# Patient Record
Sex: FEMALE | Race: WHITE | URBAN - METROPOLITAN AREA
[De-identification: names, ages, dates, MRNs, and addresses within clinical notes are randomized per-mention and may not be internally consistent; named-entity substitution may affect disease eponyms.]

---

## 2023-02-09 ENCOUNTER — EMERGENCY (EMERGENCY)
Facility: HOSPITAL | Age: 78
LOS: 0 days | Discharge: HOME | End: 2023-02-10
Attending: STUDENT IN AN ORGANIZED HEALTH CARE EDUCATION/TRAINING PROGRAM
Payer: MEDICARE

## 2023-02-09 VITALS
SYSTOLIC BLOOD PRESSURE: 146 MMHG | OXYGEN SATURATION: 96 % | HEART RATE: 68 BPM | RESPIRATION RATE: 18 BRPM | DIASTOLIC BLOOD PRESSURE: 68 MMHG

## 2023-02-09 VITALS — TEMPERATURE: 98 F

## 2023-02-09 DIAGNOSIS — Z20.822 CONTACT WITH AND (SUSPECTED) EXPOSURE TO COVID-19: ICD-10-CM

## 2023-02-09 DIAGNOSIS — Z98.890 OTHER SPECIFIED POSTPROCEDURAL STATES: ICD-10-CM

## 2023-02-09 DIAGNOSIS — Z98.1 ARTHRODESIS STATUS: ICD-10-CM

## 2023-02-09 DIAGNOSIS — R51.9 HEADACHE, UNSPECIFIED: ICD-10-CM

## 2023-02-09 DIAGNOSIS — G51.0 BELL'S PALSY: ICD-10-CM

## 2023-02-09 DIAGNOSIS — R42 DIZZINESS AND GIDDINESS: ICD-10-CM

## 2023-02-09 DIAGNOSIS — R29.810 FACIAL WEAKNESS: ICD-10-CM

## 2023-02-09 DIAGNOSIS — Z85.3 PERSONAL HISTORY OF MALIGNANT NEOPLASM OF BREAST: ICD-10-CM

## 2023-02-09 LAB
ALBUMIN SERPL ELPH-MCNC: 4.3 G/DL — SIGNIFICANT CHANGE UP (ref 3.5–5.2)
ALP SERPL-CCNC: 93 U/L — SIGNIFICANT CHANGE UP (ref 30–115)
ALT FLD-CCNC: 14 U/L — SIGNIFICANT CHANGE UP (ref 0–41)
ANION GAP SERPL CALC-SCNC: 11 MMOL/L — SIGNIFICANT CHANGE UP (ref 7–14)
APTT BLD: 29.8 SEC — SIGNIFICANT CHANGE UP (ref 27–39.2)
AST SERPL-CCNC: 34 U/L — SIGNIFICANT CHANGE UP (ref 0–41)
BASE EXCESS BLDV CALC-SCNC: 2.5 MMOL/L — SIGNIFICANT CHANGE UP (ref -2–3)
BASE EXCESS BLDV CALC-SCNC: 4 MMOL/L — HIGH (ref -2–3)
BASOPHILS # BLD AUTO: 0.04 K/UL — SIGNIFICANT CHANGE UP (ref 0–0.2)
BASOPHILS NFR BLD AUTO: 0.5 % — SIGNIFICANT CHANGE UP (ref 0–1)
BILIRUB SERPL-MCNC: 0.4 MG/DL — SIGNIFICANT CHANGE UP (ref 0.2–1.2)
BUN SERPL-MCNC: 20 MG/DL — SIGNIFICANT CHANGE UP (ref 10–20)
CA-I SERPL-SCNC: 1.19 MMOL/L — SIGNIFICANT CHANGE UP (ref 1.15–1.33)
CA-I SERPL-SCNC: 1.24 MMOL/L — SIGNIFICANT CHANGE UP (ref 1.15–1.33)
CALCIUM SERPL-MCNC: 9.7 MG/DL — SIGNIFICANT CHANGE UP (ref 8.4–10.5)
CHLORIDE SERPL-SCNC: 100 MMOL/L — SIGNIFICANT CHANGE UP (ref 98–110)
CO2 SERPL-SCNC: 26 MMOL/L — SIGNIFICANT CHANGE UP (ref 17–32)
CREAT SERPL-MCNC: 0.8 MG/DL — SIGNIFICANT CHANGE UP (ref 0.7–1.5)
EGFR: 75 ML/MIN/1.73M2 — SIGNIFICANT CHANGE UP
EOSINOPHIL # BLD AUTO: 0.07 K/UL — SIGNIFICANT CHANGE UP (ref 0–0.7)
EOSINOPHIL NFR BLD AUTO: 0.9 % — SIGNIFICANT CHANGE UP (ref 0–8)
GAS PNL BLDV: 134 MMOL/L — LOW (ref 136–145)
GAS PNL BLDV: 136 MMOL/L — SIGNIFICANT CHANGE UP (ref 136–145)
GAS PNL BLDV: SIGNIFICANT CHANGE UP
GAS PNL BLDV: SIGNIFICANT CHANGE UP
GLUCOSE SERPL-MCNC: 105 MG/DL — HIGH (ref 70–99)
HCO3 BLDV-SCNC: 28 MMOL/L — SIGNIFICANT CHANGE UP (ref 22–29)
HCO3 BLDV-SCNC: 28 MMOL/L — SIGNIFICANT CHANGE UP (ref 22–29)
HCT VFR BLD CALC: 40.2 % — SIGNIFICANT CHANGE UP (ref 37–47)
HCT VFR BLDA CALC: 41 % — SIGNIFICANT CHANGE UP (ref 39–51)
HCT VFR BLDA CALC: 41 % — SIGNIFICANT CHANGE UP (ref 39–51)
HGB BLD CALC-MCNC: 13.5 G/DL — SIGNIFICANT CHANGE UP (ref 12.6–17.4)
HGB BLD CALC-MCNC: 13.6 G/DL — SIGNIFICANT CHANGE UP (ref 12.6–17.4)
HGB BLD-MCNC: 13.6 G/DL — SIGNIFICANT CHANGE UP (ref 12–16)
IMM GRANULOCYTES NFR BLD AUTO: 0.1 % — SIGNIFICANT CHANGE UP (ref 0.1–0.3)
INR BLD: 1.03 RATIO — SIGNIFICANT CHANGE UP (ref 0.65–1.3)
LACTATE BLDV-MCNC: 1.3 MMOL/L — SIGNIFICANT CHANGE UP (ref 0.5–2)
LACTATE BLDV-MCNC: 1.9 MMOL/L — SIGNIFICANT CHANGE UP (ref 0.5–2)
LYMPHOCYTES # BLD AUTO: 3.11 K/UL — SIGNIFICANT CHANGE UP (ref 1.2–3.4)
LYMPHOCYTES # BLD AUTO: 42 % — SIGNIFICANT CHANGE UP (ref 20.5–51.1)
MCHC RBC-ENTMCNC: 31.2 PG — HIGH (ref 27–31)
MCHC RBC-ENTMCNC: 33.8 G/DL — SIGNIFICANT CHANGE UP (ref 32–37)
MCV RBC AUTO: 92.2 FL — SIGNIFICANT CHANGE UP (ref 81–99)
MONOCYTES # BLD AUTO: 1.11 K/UL — HIGH (ref 0.1–0.6)
MONOCYTES NFR BLD AUTO: 15 % — HIGH (ref 1.7–9.3)
NEUTROPHILS # BLD AUTO: 3.06 K/UL — SIGNIFICANT CHANGE UP (ref 1.4–6.5)
NEUTROPHILS NFR BLD AUTO: 41.5 % — LOW (ref 42.2–75.2)
NRBC # BLD: 0 /100 WBCS — SIGNIFICANT CHANGE UP (ref 0–0)
PCO2 BLDV: 37 MMHG — LOW (ref 39–42)
PCO2 BLDV: 45 MMHG — HIGH (ref 39–42)
PH BLDV: 7.4 — SIGNIFICANT CHANGE UP (ref 7.32–7.43)
PH BLDV: 7.48 — HIGH (ref 7.32–7.43)
PLATELET # BLD AUTO: 184 K/UL — SIGNIFICANT CHANGE UP (ref 130–400)
PO2 BLDV: 27 MMHG — SIGNIFICANT CHANGE UP
PO2 BLDV: 41 MMHG — SIGNIFICANT CHANGE UP
POTASSIUM BLDV-SCNC: 3.8 MMOL/L — SIGNIFICANT CHANGE UP (ref 3.5–5.1)
POTASSIUM BLDV-SCNC: 5.4 MMOL/L — HIGH (ref 3.5–5.1)
POTASSIUM SERPL-MCNC: 5.9 MMOL/L — HIGH (ref 3.5–5)
POTASSIUM SERPL-SCNC: 5.9 MMOL/L — HIGH (ref 3.5–5)
PROT SERPL-MCNC: 7 G/DL — SIGNIFICANT CHANGE UP (ref 6–8)
PROTHROM AB SERPL-ACNC: 11.8 SEC — SIGNIFICANT CHANGE UP (ref 9.95–12.87)
RBC # BLD: 4.36 M/UL — SIGNIFICANT CHANGE UP (ref 4.2–5.4)
RBC # FLD: 13.2 % — SIGNIFICANT CHANGE UP (ref 11.5–14.5)
SAO2 % BLDV: 34.3 % — SIGNIFICANT CHANGE UP
SAO2 % BLDV: 71.2 % — SIGNIFICANT CHANGE UP
SARS-COV-2 RNA SPEC QL NAA+PROBE: SIGNIFICANT CHANGE UP
SODIUM SERPL-SCNC: 137 MMOL/L — SIGNIFICANT CHANGE UP (ref 135–146)
TROPONIN T SERPL-MCNC: <0.01 NG/ML — SIGNIFICANT CHANGE UP
WBC # BLD: 7.4 K/UL — SIGNIFICANT CHANGE UP (ref 4.8–10.8)
WBC # FLD AUTO: 7.4 K/UL — SIGNIFICANT CHANGE UP (ref 4.8–10.8)

## 2023-02-09 PROCEDURE — 84132 ASSAY OF SERUM POTASSIUM: CPT

## 2023-02-09 PROCEDURE — 70498 CT ANGIOGRAPHY NECK: CPT | Mod: 26,MA

## 2023-02-09 PROCEDURE — 85610 PROTHROMBIN TIME: CPT

## 2023-02-09 PROCEDURE — 85730 THROMBOPLASTIN TIME PARTIAL: CPT

## 2023-02-09 PROCEDURE — 80053 COMPREHEN METABOLIC PANEL: CPT

## 2023-02-09 PROCEDURE — 70498 CT ANGIOGRAPHY NECK: CPT | Mod: MA

## 2023-02-09 PROCEDURE — 87635 SARS-COV-2 COVID-19 AMP PRB: CPT

## 2023-02-09 PROCEDURE — 70450 CT HEAD/BRAIN W/O DYE: CPT | Mod: 26,MA,59

## 2023-02-09 PROCEDURE — 93010 ELECTROCARDIOGRAM REPORT: CPT

## 2023-02-09 PROCEDURE — 85018 HEMOGLOBIN: CPT

## 2023-02-09 PROCEDURE — 84295 ASSAY OF SERUM SODIUM: CPT

## 2023-02-09 PROCEDURE — 70450 CT HEAD/BRAIN W/O DYE: CPT | Mod: MA

## 2023-02-09 PROCEDURE — 82803 BLOOD GASES ANY COMBINATION: CPT

## 2023-02-09 PROCEDURE — 70551 MRI BRAIN STEM W/O DYE: CPT | Mod: 26,MA

## 2023-02-09 PROCEDURE — 84484 ASSAY OF TROPONIN QUANT: CPT

## 2023-02-09 PROCEDURE — 70496 CT ANGIOGRAPHY HEAD: CPT | Mod: MA,XU

## 2023-02-09 PROCEDURE — 93010 ELECTROCARDIOGRAM REPORT: CPT | Mod: 77

## 2023-02-09 PROCEDURE — 70496 CT ANGIOGRAPHY HEAD: CPT | Mod: 26,MA

## 2023-02-09 PROCEDURE — 0042T: CPT | Mod: MA

## 2023-02-09 PROCEDURE — 82330 ASSAY OF CALCIUM: CPT

## 2023-02-09 PROCEDURE — 36415 COLL VENOUS BLD VENIPUNCTURE: CPT

## 2023-02-09 PROCEDURE — 93005 ELECTROCARDIOGRAM TRACING: CPT

## 2023-02-09 PROCEDURE — 70551 MRI BRAIN STEM W/O DYE: CPT | Mod: MA

## 2023-02-09 PROCEDURE — 99285 EMERGENCY DEPT VISIT HI MDM: CPT | Mod: 25

## 2023-02-09 PROCEDURE — 85014 HEMATOCRIT: CPT

## 2023-02-09 PROCEDURE — 99285 EMERGENCY DEPT VISIT HI MDM: CPT

## 2023-02-09 PROCEDURE — 85025 COMPLETE CBC W/AUTO DIFF WBC: CPT

## 2023-02-09 PROCEDURE — 83605 ASSAY OF LACTIC ACID: CPT

## 2023-02-09 RX ORDER — VALACYCLOVIR 500 MG/1
1 TABLET, FILM COATED ORAL
Qty: 21 | Refills: 0
Start: 2023-02-09 | End: 2023-02-15

## 2023-02-09 RX ORDER — VALACYCLOVIR 500 MG/1
1000 TABLET, FILM COATED ORAL ONCE
Refills: 0 | Status: COMPLETED | OUTPATIENT
Start: 2023-02-09 | End: 2023-02-09

## 2023-02-09 RX ADMIN — VALACYCLOVIR 1000 MILLIGRAM(S): 500 TABLET, FILM COATED ORAL at 23:00

## 2023-02-09 RX ADMIN — Medication 60 MILLIGRAM(S): at 22:31

## 2023-02-09 NOTE — ED PROVIDER NOTE - NSFOLLOWUPINSTRUCTIONS_ED_ALL_ED_FT
[place]'s Palsy    Bell's palsy is a condition in which the muscles on one side of the face become paralyzed. This often causes one side of the face to droop. It is a common condition and most people recover completely. Causes include viral infections but most of the time the reason remains unknown. Signs and symptoms include not being able to raise your eyebrow, not being able to close your eye, drooping of the eyelid and corner of the mouth, sensitivity to loud noises, dryness of the eye, change in taste, and not being able to close your mouth/drooling. Take medicines only as directed by your health care provider. If your eye is affected use moisturizing eye drops to prevent drying of your eye and tape your eyelid shut at night as directed by your health care provider.    SEEK IMMEDIATE MEDICAL CARE IF YOU HAVE THE FOLLOWING SYMPTOMS: weakness or numbness in another part of your body, difficulty swallowing, fever, or neck pain.      Eye care to prevent corneal injury — All patients with Bell's palsy should be assessed for the completeness of eyelid closure. For those with incomplete closure (ie, sclera remains visible when the patient is asked to close the eye), meticulous eye care is required to prevent corneal injury [2]. In addition to physical exposure due to eyelid weakness, the cornea is at increased risk for dryness and abrasion due to reduced tear production from the lacrimal gland, which is also innervated by the facial nerve.    Eye care includes the following:    ?Waking hours – Patients should use artificial tear drops (liquid or gel) four times daily and up to hourly if needed. Gel formulations may cause transient blurry vision when applied. Artificial tears are available over the counter. Formulations with preservatives are safe for use four times a day, but preservative-free formulations are probably safer for more frequent application. (See "Dry eye disease", section on 'Artificial tears'.)    Protective glasses or goggles can be worn to physically protect the eye from external trauma. Some patients may prefer to tape the eyelid closed during the day to prevent exposure, using the same procedure as described for sleep. Patches are generally not recommended because there is a tendency for the eye to open under the patch and expose the cornea.    Patients with refractory eye dryness and exposure symptoms should be referred to ophthalmology for discussion of additional options such as a scleral lens, adhesive moisture chambers, temporary-suture tarsorrhaphy, and gold or platinum eyelid weighting

## 2023-02-09 NOTE — ED PROVIDER NOTE - OBJECTIVE STATEMENT
Patient is a 77 year old female with a PMH of breast cancer and lumbar fusion 25 years ago presenting for a stroke code. Per patient, she had a sudden onset of left facial weakness this morning, associated with vertigo and general weakness as well as difficulty walking (last well known: last night around 2200 hours). Patient endorses she had a dental procedure yesterday but otherwise denies any recent trauma, headaches, environmental exposures, fevers, chest pain, nausea, vomiting, dizziness, confusion, or additional complaints.

## 2023-02-09 NOTE — ED PROVIDER NOTE - ATTENDING CONTRIBUTION TO CARE
77 yr old f w/ a pmh significant for breast cancer who presents as a stroke notification. As per pt, at ~ 5:30 pm, while shopping she developed a headache, pts  then noticed a L sided facial drop. Pt denies any other medical complaints.     VITAL SIGNS: I have reviewed nursing notes and confirm.  CONSTITUTIONAL: non-toxic, well appearing  SKIN: no rash, no petechiae.  EYES: EOMI, pink conjunctiva, anicteric  ENT: tongue midline, no exudates, MMM  NECK: Supple; no meningismus, no JVD  CARD: RRR, no murmurs, equal radial pulses bilaterally 2+  RESP: CTAB, no respiratory distress  ABD: Soft, non-tender, non-distended, no peritoneal signs, no HSM, no CVA tenderness  EXT: Normal ROM x4. No edema. No calves tenderness  NEURO: Alert, oriented x3.  L sided facial droop    77 yr old f that presents as a stroke notification. neuro at bedside. tele stroke aware. labs, ekg, imaging, reassess. dispo pending.

## 2023-02-09 NOTE — ED ADULT TRIAGE NOTE - SOURCE OF INFORMATION
Patient/EMS pt received semi denny in bed in [facility], agreeable to OT evaluation, son present, +IV lock, left seated in b/s chair in NAD, RN aware

## 2023-02-09 NOTE — CONSULT NOTE ADULT - SUBJECTIVE AND OBJECTIVE BOX
Neurology Consult    Patient is a 77y old  Female who presents with a chief complaint of left facial weakness    HPI:  77 yr old right handed woman with a PMH of breast cancer and lumbar fusion 25 years ago. Presents with left facial asymmetry noticed by her family. Patient with several teeth extracted one day prior to presentation. Patient with GI illness within the last week, left ear discomfort within the last three days. Patient last asymptomatic the previous evening at 2200. Woke up 1000 on DOP with room spinning dizziness and generalized weakness as well as gait ataxia. At 5:30 pm, while shopping she developed a left sided dull headache,  then noticed a L sided facial drop. Stroke code en route. Patient initially described LUE numbness which was transient.         PAST MEDICAL & SURGICAL HISTORY:      FAMILY HISTORY:      Social History: (-) x 3    Allergies    No Known Allergies    Intolerances        MEDICATIONS  (STANDING):    MEDICATIONS  (PRN):      Vital Signs Last 24 Hrs  T(C): --  T(F): --  HR: 68 (09 Feb 2023 20:42) (68 - 68)  BP: 146/68 (09 Feb 2023 20:42) (146/68 - 146/68)  BP(mean): --  RR: 18 (09 Feb 2023 20:42) (18 - 18)  SpO2: 96% (09 Feb 2023 20:42) (96% - 96%)    Parameters below as of 09 Feb 2023 20:42  Patient On (Oxygen Delivery Method): room air        Examination:  Cognitive/Language:  The patient is oriented to person, place, time and date.  Recent and remote memory intact.  Fund of knowledge is intact and normal.  Language with normal repetition, comprehension and naming.  Subtly dysarthric.    Eyes: intact VA, VFF.  EOMI w/o nystagmus, skew or reported double vision.  PERRL.  No ptosis/weakness of eyelid closure.    Face:  asynchronous blinking of left eye, left eyelid weakness, inability to raise left eyebrow(left forehead flattening), asymmetric smile.   Formal Muscle Strength Testing: (MRC grade R/L) 5/5 UE; 5/5 LE.  Sensory examination:   Intact to light touch in all extremities however left facial numbness present.  Cerebellum:   FTN/HKS intact with normal BROOKE in all limbs.  No dysmetria or dysdiadokinesia.  Gait narrow based and normal on ambulation.       NIHSS 4    Labs:   CBC Full  -  ( 09 Feb 2023 19:18 )  WBC Count : 7.40 K/uL  RBC Count : 4.36 M/uL  Hemoglobin : 13.6 g/dL  Hematocrit : 40.2 %  Platelet Count - Automated : 184 K/uL  Mean Cell Volume : 92.2 fL  Mean Cell Hemoglobin : 31.2 pg  Mean Cell Hemoglobin Concentration : 33.8 g/dL  Auto Neutrophil # : 3.06 K/uL  Auto Lymphocyte # : 3.11 K/uL  Auto Monocyte # : 1.11 K/uL  Auto Eosinophil # : 0.07 K/uL  Auto Basophil # : 0.04 K/uL  Auto Neutrophil % : 41.5 %  Auto Lymphocyte % : 42.0 %  Auto Monocyte % : 15.0 %  Auto Eosinophil % : 0.9 %  Auto Basophil % : 0.5 %    02-09    137  |  100  |  20  ----------------------------<  105<H>  5.9<H>   |  26  |  0.8    Ca    9.7      09 Feb 2023 19:18    TPro  7.0  /  Alb  4.3  /  TBili  0.4  /  DBili  x   /  AST  34  /  ALT  14  /  AlkPhos  93  02-09    LIVER FUNCTIONS - ( 09 Feb 2023 19:18 )  Alb: 4.3 g/dL / Pro: 7.0 g/dL / ALK PHOS: 93 U/L / ALT: 14 U/L / AST: 34 U/L / GGT: x           PT/INR - ( 09 Feb 2023 19:18 )   PT: 11.80 sec;   INR: 1.03 ratio         PTT - ( 09 Feb 2023 19:18 )  PTT:29.8 sec        Neuroimaging:  NCHCT:   < from: CT Brain Stroke Protocol (02.09.23 @ 19:13) >  IMPRESSION:    No acute intracranial pathology.    < end of copied text >  < from: CT Brain Perfusion Maps Stroke (02.09.23 @ 19:31) >    IMPRESSION:    Negative CTA of the head and neck    No evidence of major vascular stenosis, occlusion, or aneurysm.    No perfusion abnormalities. No areas of core infarct or ischemic penumbra.    < end of copied text >    02-09-23 @ 20:44

## 2023-02-09 NOTE — ED PROVIDER NOTE - CLINICAL SUMMARY MEDICAL DECISION MAKING FREE TEXT BOX
77 yr old f that presents with L sided facial drooping. Pt called as a stroke code. Labs and EKG were ordered and reviewed.  Imaging was ordered and reviewed by me.  Appropriate medications for patient's presenting complaints were ordered and effects were reassessed.  Patient's records (prior hospital, ED visit, and/or nursing home notes if available) were reviewed.  Additional history was obtained from EMS, family, and/or PCP (where available).  Escalation to admission/observation was considered.  However patient feels much better and is comfortable with discharge.  Appropriate follow-up was arranged. MRI negative. Pt evaluated and cleared by neurology. concern for bells palsy. will dc pt with antivirals and steroids.     I have discussed the discharge plan with the patient. The patient agrees with the plan, as discussed.  The patient understands Emergency Department diagnosis is a preliminary diagnosis often based on limited information and that the patient must adhere to the follow-up plan as discussed.  The patient understands that if the symptoms worsen or if prescribed medications do not have the desired/planned effect that the patient may return to the Emergency Department at any time for further evaluation and treatment.

## 2023-02-09 NOTE — ED PROVIDER NOTE - PATIENT PORTAL LINK FT
You can access the FollowMyHealth Patient Portal offered by [facility] by registering at the following website: [email or web address]. By joining [employer]’s FollowMyHealth portal, you will also be able to view your health information using other applications (apps) compatible with our system.

## 2023-02-09 NOTE — ED PROVIDER NOTE - CARE PROVIDER_API CALL
Mele Osuna)  Neuromuscular Medicine  [address] [place] [address], [address]  [place], [place] [id number]  Phone: [phone]  Fax: [phone]  Follow Up Time: Urgent

## 2023-02-09 NOTE — CONSULT NOTE ADULT - ASSESSMENT
Impression:  77 yr old right handed woman with a PMH of breast cancer and lumbar fusion 25 years ago. Presents with left facial asymmetry noticed by her family. Patient with several teeth extracted one day prior to presentation. Patient with GI illness within the last week, left ear discomfort within the last three days. Patient last asymptomatic the previous evening at 2200. Woke up 1000 oon DOP with room spinning dizziness and generalized weakness as well as gait ataxia. At 5:30 pm, while shopping she developed a left sided dull headache,  then noticed a L sided facial drop. Stroke code en route. NIHSS 4 for left facial weakness, dysarthria and left facial sensory deficit. CTA without LVO. Examined by telestroke DR. Stephens, due to symptoms throughout the day, recent dental surgery and non disabling symptoms discussed TNK with the family and we came to the decision risk was greater than benefit of IV thrombolytics. Patient initially described LUE numbness which was transient.   Post MRI etiology of symptoms may become more clear, if MRI brain negative for acute stroke likely due to peripheral cause such as bells palsy.       Suggestion:  Follow up MRI brain.   Left eye patch.   Lacrilube to left eye.   If MRI brain negative for acute stroke suggest medical management for [place] Palsy.  Impression:  77 yr old right handed woman with a PMH of breast cancer and lumbar fusion 25 years ago. Presents with left facial asymmetry noticed by her family. Patient with several teeth extracted one day prior to presentation. Patient with GI illness within the last week, left ear discomfort within the last three days. Patient last asymptomatic the previous evening at 2200. Woke up 1000 oon DOP with room spinning dizziness and generalized weakness as well as gait ataxia. At 5:30 pm, while shopping she developed a left sided dull headache,  then noticed a L sided facial drop. Stroke code en route. NIHSS 4 for left facial weakness, dysarthria and left facial sensory deficit. CTA without LVO. Examined by telestroke DR. Stephens, due to symptoms throughout the day, recent dental surgery and non disabling symptoms discussed TNK with the family and we came to the decision risk was greater than benefit of IV thrombolytics. Patient initially described LUE numbness which was transient.   Post MRI etiology of symptoms may become more clear, if MRI brain negative for acute stroke likely due to peripheral cause such as bells palsy.       Suggestion:  Discussed with Dr. Osuna.  Left eye patch.   Lacrilube to left eye.   Antivirals and steroids for [place] Palsy treatment  Lyme Titers and Western Blot.  Educated if any worsening symptoms please call 911 and return to ED.  If MRI brain negative for acute stroke suggest medical management for [place] Palsy.   Follow up in the stroke clinic in 2 weeks.

## 2023-02-09 NOTE — STROKE CODE NOTE - IV ALTEPLASE EXCLUSION ABS OTHER
Patient woke up 10am with room spinning dizziness and general malaise, 1700 left sided headache and family noticed left facial asymmetry. Patient with non disabling symptoms and last known well is unclear. Also with several teeth extracted yesterday.

## 2023-02-09 NOTE — ED PROVIDER NOTE - PHYSICAL EXAMINATION
VITAL SIGNS: I have reviewed nursing notes and confirm.  CONSTITUTIONAL: Well-appearing, non-toxic, in NAD  SKIN: Warm dry, normal skin turgor  HEAD: NCAT; left sided facial asymmetry with partial forehead sparing noted; normal sensation to left side of face  EYES: No conjunctival injection, scleral anicteric  ENT: Moist mucous membranes, normal pharynx with no erythema or exudates  NECK: Supple; full ROM. Nontender. No cervical LAD  CARD: RRR, no murmurs, rubs or gallops  RESP: Clear to ausculation bilaterally.  No rales, rhonchi, or wheezing.  ABD: Soft, non-distended, non-tender, no rebound or guarding. No CVA tenderness  EXT: Full ROM, no bony tenderness, no pedal edema, no calf tenderness  NEURO: Normal motor, normal sensory.  PSYCH: Cooperative, appropriate.

## 2023-02-14 PROBLEM — Z78.9 OTHER SPECIFIED HEALTH STATUS: Chronic | Status: ACTIVE | Noted: 2023-02-09

## 2023-03-23 ENCOUNTER — APPOINTMENT (OUTPATIENT)
Dept: NEUROLOGY | Facility: CLINIC | Age: 78
End: 2023-03-23
Payer: MEDICARE

## 2023-03-23 VITALS
HEART RATE: 72 BPM | DIASTOLIC BLOOD PRESSURE: 72 MMHG | HEIGHT: 63 IN | BODY MASS INDEX: 23.92 KG/M2 | SYSTOLIC BLOOD PRESSURE: 111 MMHG | WEIGHT: 135 LBS

## 2023-03-23 DIAGNOSIS — Z85.3 PERSONAL HISTORY OF MALIGNANT NEOPLASM OF BREAST: ICD-10-CM

## 2023-03-23 DIAGNOSIS — G43.809 OTHER MIGRAINE, NOT INTRACTABLE, W/OUT STATUS MIGRAINOSUS: ICD-10-CM

## 2023-03-23 DIAGNOSIS — Z86.39 PERSONAL HISTORY OF OTHER ENDOCRINE, NUTRITIONAL AND METABOLIC DISEASE: ICD-10-CM

## 2023-03-23 DIAGNOSIS — G51.0 BELL'S PALSY: ICD-10-CM

## 2023-03-23 PROBLEM — Z00.00 ENCOUNTER FOR PREVENTIVE HEALTH EXAMINATION: Status: ACTIVE | Noted: 2023-03-23

## 2023-03-23 PROCEDURE — 99203 OFFICE O/P NEW LOW 30 MIN: CPT

## 2023-03-23 RX ORDER — ALPRAZOLAM 1 MG/1
1 TABLET ORAL
Refills: 0 | Status: ACTIVE | COMMUNITY

## 2023-03-23 RX ORDER — DICLOFENAC SODIUM 20 MG/G
2 SOLUTION TOPICAL TWICE DAILY
Qty: 60 | Refills: 5 | Status: ACTIVE | COMMUNITY
Start: 2023-03-23 | End: 1900-01-01

## 2023-03-23 RX ORDER — ESCITALOPRAM OXALATE 10 MG/1
10 TABLET, FILM COATED ORAL
Refills: 0 | Status: ACTIVE | COMMUNITY

## 2023-03-23 NOTE — PHYSICAL EXAM

## 2023-03-23 NOTE — HISTORY OF PRESENT ILLNESS
[FreeTextEntry1] : It's a pleasure to see Ms. GRZEGORZ BRENNAN In the office today. She is a 77 year -  old woman  who presents to the office today for the evaluation of  left-sided Bell's palsy a month ago.  She was previously seen by us in 2019 for cervicogenic headache which was well controlled by using compound cream which she requested a refill.  She is accompanied by her son today mainly because the acute onset left-sided Bell's palsy a month ago which was preceded by having left-sided ear pain.  Her Bell's palsy has pretty much resolved after the use of steroids as well as antiviral.  She did have a CT scan of the head at the hospital which was said to be normal but the result of which is not available to us at this time.\par

## 2023-03-23 NOTE — ASSESSMENT
[FreeTextEntry1] : [place] Palsy / Lin Hunts Syndrome. \par - resolved\par -no further intervention necessary at this time \par \par \par cervicogenic headache\par - since compound cream worked, I will refill that for her\par \par \par \par \par \par \par I, Ginette Soria, Attest that this documentation has been prepared under the direction and in the presence of Provider Pankaj Paz DO\abhishek \par Thank You for letting me assist in the management of this patient. \par \par Pankaj aPz DO\abhishek Board Certified, Neurology\par

## 2023-09-16 ENCOUNTER — EMERGENCY (EMERGENCY)
Facility: HOSPITAL | Age: 78
LOS: 0 days | Discharge: HOME | End: 2023-09-16
Attending: STUDENT IN AN ORGANIZED HEALTH CARE EDUCATION/TRAINING PROGRAM
Payer: MEDICARE

## 2023-09-16 VITALS
OXYGEN SATURATION: 99 % | DIASTOLIC BLOOD PRESSURE: 61 MMHG | RESPIRATION RATE: 18 BRPM | TEMPERATURE: 97 F | HEART RATE: 73 BPM | SYSTOLIC BLOOD PRESSURE: 130 MMHG
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
## 2023-09-16 DIAGNOSIS — S00.531A CONTUSION OF LIP, INITIAL ENCOUNTER: ICD-10-CM

## 2023-09-16 DIAGNOSIS — Z04.3 ENCOUNTER FOR EXAMINATION AND OBSERVATION FOLLOWING OTHER ACCIDENT: ICD-10-CM

## 2023-09-16 DIAGNOSIS — Z23 ENCOUNTER FOR IMMUNIZATION: ICD-10-CM
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
## 2023-09-16 PROCEDURE — 70450 CT HEAD/BRAIN W/O DYE: CPT | Mod: MA

## 2023-09-16 PROCEDURE — 72125 CT NECK SPINE W/O DYE: CPT | Mod: MA

## 2023-09-16 PROCEDURE — 72125 CT NECK SPINE W/O DYE: CPT | Mod: 26,MA

## 2023-09-16 PROCEDURE — 99284 EMERGENCY DEPT VISIT MOD MDM: CPT | Mod: 25

## 2023-09-16 PROCEDURE — 70486 CT MAXILLOFACIAL W/O DYE: CPT | Mod: 26,MA

## 2023-09-16 PROCEDURE — 99284 EMERGENCY DEPT VISIT MOD MDM: CPT

## 2023-09-16 PROCEDURE — 70486 CT MAXILLOFACIAL W/O DYE: CPT | Mod: MA

## 2023-09-16 PROCEDURE — 70450 CT HEAD/BRAIN W/O DYE: CPT | Mod: 26,MA

## 2023-09-16 PROCEDURE — 90715 TDAP VACCINE 7 YRS/> IM: CPT

## 2023-09-16 PROCEDURE — 90471 IMMUNIZATION ADMIN: CPT

## 2023-09-16 RX ORDER — ACETAMINOPHEN 500 MG
650 TABLET ORAL ONCE
Refills: 0 | Status: COMPLETED | OUTPATIENT
Start: 2023-09-16 | End: 2023-09-16

## 2023-09-16 RX ORDER — TETANUS TOXOID, REDUCED DIPHTHERIA TOXOID AND ACELLULAR PERTUSSIS VACCINE, ADSORBED 5; 2.5; 8; 8; 2.5 [IU]/.5ML; [IU]/.5ML; UG/.5ML; UG/.5ML; UG/.5ML
0.5 SUSPENSION INTRAMUSCULAR ONCE
Refills: 0 | Status: COMPLETED | OUTPATIENT
Start: 2023-09-16 | End: 2023-09-16

## 2023-09-16 RX ADMIN — TETANUS TOXOID, REDUCED DIPHTHERIA TOXOID AND ACELLULAR PERTUSSIS VACCINE, ADSORBED 0.5 MILLILITER(S): 5; 2.5; 8; 8; 2.5 SUSPENSION INTRAMUSCULAR at 14:54

## 2023-09-16 RX ADMIN — Medication 650 MILLIGRAM(S): at 14:53

## 2023-09-16 NOTE — ED PROVIDER NOTE - PATIENT PORTAL LINK FT
You can access the FollowMyHealth Patient Portal offered by [facility] by registering at the following website: [email or web address]. By joining [facility]’s FollowMyHealth portal, you will also be able to view your health information using other applications (apps) compatible with our system.

## 2023-09-16 NOTE — ED PROVIDER NOTE - NSFOLLOWUPINSTRUCTIONS_ED_ALL_ED_FT
Please follow up with your primary care doctor in 1-3 days     Fall Prevention in the Home, Adult  Falls can cause injuries. They can happen to people of all ages. There are many things you can do to make your home safe and to help prevent falls. Ask for help when making these changes, if needed.    What actions can I take to prevent falls?  General Instructions     Use good lighting in all rooms. Replace any light bulbs that burn out.  Turn on the lights when you go into a dark area. Use night-lights.  Keep items that you use often in easy-to-reach places. Lower the shelves around your home if necessary.  Set up your furniture so you have a clear path. Avoid moving your furniture around.  Do not have throw rugs and other things on the floor that can make you trip.  Avoid walking on wet floors.  If any of your floors are uneven, fix them.  Add color or contrast paint or tape to clearly shay and help you see:  Any grab bars or handrails.  First and last steps of stairways.  Where the edge of each step is.  If you use a stepladder:  Make sure that it is fully opened. Do not climb a closed stepladder.  Make sure that both sides of the stepladder are locked into place.  Ask someone to hold the stepladder for you while you use it.  If there are any pets around you, be aware of where they are.  What can I do in the bathroom?     Keep the floor dry. Clean up any water that spills onto the floor as soon as it happens.  Remove soap buildup in the tub or shower regularly.  Use non-skid mats or decals on the floor of the tub or shower.  Attach bath mats securely with double-sided, non-slip rug tape.  If you need to sit down in the shower, use a plastic, non-slip stool.  Install grab bars by the toilet and in the tub and shower. Do not use towel bars as grab bars.  What can I do in the bedroom?     Make sure that you have a light by your bed that is easy to reach.  Do not use any sheets or blankets that are too big for your bed. They should not hang down onto the floor.  Have a firm chair that has side arms. You can use this for support while you get dressed.  What can I do in the kitchen?     Clean up any spills right away.  If you need to reach something above you, use a strong step stool that has a grab bar.  Keep electrical cords out of the way.  Do not use floor polish or wax that makes floors slippery. If you must use wax, use non-skid floor wax.  What can I do with my stairs?     Do not leave any items on the stairs.  Make sure that you have a light switch at the top of the stairs and the bottom of the stairs. If you do not have them, ask someone to add them for you.  Make sure that there are handrails on both sides of the stairs, and use them. Fix handrails that are broken or loose. Make sure that handrails are as long as the stairways.  Install non-slip stair treads on all stairs in your home.  Avoid having throw rugs at the top or bottom of the stairs. If you do have throw rugs, attach them to the floor with carpet tape.  Choose a carpet that does not hide the edge of the steps on the stairway.  Check any carpeting to make sure that it is firmly attached to the stairs. Fix any carpet that is loose or worn.  What can I do on the outside of my home?     Use bright outdoor lighting.  Regularly fix the edges of walkways and driveways and fix any cracks.  Remove anything that might make you trip as you walk through a door, such as a raised step or threshold.  Trim any bushes or trees on the path to your home.  Regularly check to see if handrails are loose or broken. Make sure that both sides of any steps have handrails.  Install guardrails along the edges of any raised decks and porches.  Clear walking paths of anything that might make someone trip, such as tools or rocks.  Have any leaves, snow, or ice cleared regularly.  Use sand or salt on walking paths during winter.  Clean up any spills in your garage right away. This includes grease or oil spills.  What other actions can I take?     Wear shoes that:  Have a low heel. Do not wear high heels.  Have rubber bottoms.  Are comfortable and fit you well.  Are closed at the toe. Do not wear open-toe sandals.  Use tools that help you move around (mobility aids) if they are needed. These include:  Canes.  Walkers.  Scooters.  Crutches.  Review your medicines with your doctor. Some medicines can make you feel dizzy. This can increase your chance of falling.  Ask your doctor what other things you can do to help prevent falls.    Where to find more information  Centers for Disease Control and Prevention, JYOTIADI: https://cdc.gov  [employer] [place] on Aging: https://zk8gmud.elmo.nih.gov  Contact a doctor if:  You are afraid of falling at home.  You feel weak, drowsy, or dizzy at home.  You fall at home.  Summary  There are many simple things that you can do to make your home safe and to help prevent falls.  Ways to make your home safe include removing tripping hazards and installing grab bars in the bathroom.  Ask for help when making these changes in your home.  This information is not intended to replace advice given to you by your health care provider. Make sure you discuss any questions you have with your health care provider.

## 2023-09-16 NOTE — ED ADULT NURSE NOTE - NSFALLRISKINTERV_ED_ALL_ED

## 2023-09-16 NOTE — ED ADULT NURSE NOTE - OBJECTIVE STATEMENT
Patient presents to ED S/p mechanical fall + facial injury. As per patient, she tripped and fell face forward on sidewalk. Also c/o right knee pain. Not on AC. GCS 15. Denies any other symptoms. A&O x4.

## 2023-09-16 NOTE — ED ADULT NURSE NOTE - NSFALLRISK_ED_ALL_ED
Detail Level: Zone Hide Additional Notes?: No Additional Note: Patient decided not move forward with cosmetic removal today. She states she will make another appointment for a later time to have 14 removed. No

## 2023-09-16 NOTE — ED PROVIDER NOTE - CLINICAL SUMMARY MEDICAL DECISION MAKING FREE TEXT BOX
77-year-old female presenting today for fall.  Patient a mechanical fall today.  CT scans are grossly unremarkable.  Patient received tetanus vaccination.  Patient discharged to follow-up with PMD.  Return precautions explained to patient.

## 2023-09-16 NOTE — ED PROVIDER NOTE - PHYSICAL EXAMINATION
VITAL SIGNS: I have reviewed nursing notes and confirm.  CONSTITUTIONAL: in no acute distress.  SKIN: Skin exam is warm and dry, no acute rash.  HEAD: Normocephalic; swelling and ecchyimosos to right upper lip w/ abrasions. bleeding controlled, no lacerations  EYES: PERRL, EOM intact; conjunctiva and sclera clear.  ENT: No nasal discharge; airway clear.   NECK: Supple; non tender. no cervical tenderenss  CARD: S1, S2 normal; no murmurs, gallops, or rubs. Regular rate and rhythm.  RESP: No wheezes, rales or rhonchi. Speaking in full sentences.   ABD: Normal bowel sounds; soft; non-distended; non-tender; No rebound or guarding. No CVA tenderness. no midline tenderness  EXT: Normal ROM. No clubbing, cyanosis or edema.  NEURO: Alert, oriented. Grossly unremarkable. No focal deficits.

## 2025-01-21 ENCOUNTER — APPOINTMENT (OUTPATIENT)
Dept: ORTHOPEDIC SURGERY | Facility: CLINIC | Age: 80
End: 2025-01-21

## 2025-02-03 ENCOUNTER — APPOINTMENT (OUTPATIENT)
Dept: ORTHOPEDIC SURGERY | Facility: CLINIC | Age: 80
End: 2025-02-03
Payer: MEDICARE

## 2025-02-03 DIAGNOSIS — S83.282A OTHER TEAR OF LATERAL MENISCUS, CURRENT INJURY, LEFT KNEE, INITIAL ENCOUNTER: ICD-10-CM

## 2025-02-03 PROCEDURE — 99203 OFFICE O/P NEW LOW 30 MIN: CPT

## 2025-02-03 PROCEDURE — 73562 X-RAY EXAM OF KNEE 3: CPT | Mod: 50

## 2025-02-13 NOTE — ED ADULT TRIAGE NOTE - TEMPERATURE IN FAHRENHEIT (DEGREES F)
-Symptoms include runny nose, cough  -Denies fever, chills, headache, myalgias, diarrhea  -Robitussin DM prn available  -Supportive care  -Respiratory viral panel if fever develops   97.3

## 2025-02-18 ENCOUNTER — APPOINTMENT (OUTPATIENT)
Dept: ORTHOPEDIC SURGERY | Facility: CLINIC | Age: 80
End: 2025-02-18

## 2025-03-31 ENCOUNTER — APPOINTMENT (OUTPATIENT)
Dept: ORTHOPEDIC SURGERY | Facility: CLINIC | Age: 80
End: 2025-03-31
Payer: MEDICARE

## 2025-03-31 PROCEDURE — 99212 OFFICE O/P EST SF 10 MIN: CPT

## 2025-04-03 ENCOUNTER — APPOINTMENT (OUTPATIENT)
Dept: ORTHOPEDIC SURGERY | Facility: CLINIC | Age: 80
End: 2025-04-03
Payer: MEDICARE

## 2025-04-03 DIAGNOSIS — S83.282A OTHER TEAR OF LATERAL MENISCUS, CURRENT INJURY, LEFT KNEE, INITIAL ENCOUNTER: ICD-10-CM

## 2025-04-03 DIAGNOSIS — M25.561 PAIN IN RIGHT KNEE: ICD-10-CM

## 2025-04-03 PROCEDURE — 20610 DRAIN/INJ JOINT/BURSA W/O US: CPT | Mod: 50
